# Patient Record
Sex: FEMALE | Race: WHITE | URBAN - METROPOLITAN AREA
[De-identification: names, ages, dates, MRNs, and addresses within clinical notes are randomized per-mention and may not be internally consistent; named-entity substitution may affect disease eponyms.]

---

## 2017-01-18 ENCOUNTER — THERAPY VISIT (OUTPATIENT)
Dept: PHYSICAL THERAPY | Facility: CLINIC | Age: 52
End: 2017-01-18
Payer: COMMERCIAL

## 2017-01-18 DIAGNOSIS — M79.642 PAIN OF LEFT HAND: ICD-10-CM

## 2017-01-18 DIAGNOSIS — M25.532 LEFT WRIST PAIN: Primary | ICD-10-CM

## 2017-01-18 PROCEDURE — 97110 THERAPEUTIC EXERCISES: CPT | Mod: GP | Performed by: PHYSICAL THERAPIST

## 2017-01-18 PROCEDURE — 97161 PT EVAL LOW COMPLEX 20 MIN: CPT | Mod: GP | Performed by: PHYSICAL THERAPIST

## 2017-01-18 NOTE — PROGRESS NOTES
[facility] Initial Evaluation      Subjective:    Lennie Hunt is a 51 year old female with a left wrist (L hand, forearm upper arm pain) condition.  Condition occurred with:  Insidious onset.  Condition occurred: for unknown reasons.  This is a chronic condition  Reports onset of L wrist and hand pain in 9/2016. She noted increased pain after lifting/caring for her grandson( 25#) in 12/2016. The pain began radiating into the forearm and upper arm in 12/2016 especially with lifting grasping activities. MD advised PT on 1/6/17 .    Patient reports pain:  Forearm, hand and other (wrist).  Radiates to:  Upper arm.  Pain is described as aching and sharp and is intermittent and reported as 3/10.   Pain is the same all the time.  Symptoms are exacerbated by activity/movement, lifting and carrying and relieved by nothing.  Since onset symptoms are gradually worsening.        General health as reported by patient is good.  Pertinent medical history includes:  Overweight, thyroid problems and menopausal.  Medical allergies: yes (sulfa).    Current medications:  Thyroid medication and hormone replacement therapy.  Current occupation is .  Patient is working in normal job without restrictions.  Primary job tasks include:  Prolonged sitting.        Red flags:  None as reported by the patient.                      Objective:    System              Cervical/Thoracic Evaluation  Cervical AROM: normal           Cervical Myotomes:  normal                                       Shoulder Evaluation:  ROM:  AROM:  normal                            PROM:  normal                                Strength:  normal                                   ROM:  AROM:      Flexion Elbow:  Left:wnl     Extension Elbow:  Left: wnl      Flexion Wrist:  Left: 70+ dorsal wrist    Right: 80  Extension Wrist:  Left: 75+ pain dorsal wrist    Right:  85  Supination Elbow/Wrist:  Left: 80+ wrist/handRight: 85  Pronation  Elbow/Wrist:  Left: 90    Right: 90      PROM:          Flexion Wrist:  Left: 75+    Right:  85  Extension Wrist:  Left: 80+Right: 90              Strength:    Flexion Elbow:  Left: 5/5  Pain:-      Extension Elbow: Left: 5/5  Pain:-      Flexion Wrist:  Left: 5/5 Pain:-      Extension Wrist: Left: 5/5  Pain:-    Supination Elbow/Wrist: Left: 5-/5  Pain:+/-      Pronation Elbow/Wrist: Left: 5/5  Pain:-          Radial Deviation:  Left: 5/5  Pain:-      Ulnar Deviation: Left: 5/5  Pain:-    :     Left: 30# painful      Right: 50#      Palpation:    Left wrist/elbow tenderness present at: Proximal Carpals and Distal Carpals                                  General     ROS    Assessment/Plan:      Patient is a 51 year old female with left side wrist/hand complaints.    Patient has the following significant findings with corresponding treatment plan.                Diagnosis 1:  Arm pain, wrist and hand sprain/strain  Pain -  hot/cold therapy, manual therapy, self management, education and home program  Decreased ROM/flexibility - manual therapy, therapeutic exercise and home program  Decreased joint mobility - manual therapy, therapeutic exercise and home program  Decreased strength - therapeutic exercise, therapeutic activities and home program  Impaired muscle performance - neuro re-education and home program  Decreased function - therapeutic activities and home program    Therapy Evaluation Codes:   1) History comprised of:   Personal factors that impact the plan of care:      None.    Comorbidity factors that impact the plan of care are:      None.     Medications impacting care: None.  2) Examination of Body Systems comprised of:   Body structures and functions that impact the plan of care:      Cervical spine, Elbow, Hand, Shoulder and Wrist.   Activity limitations that impact the plan of care are:      Bathing, Cooking, Grasping and Lifting.  3) Clinical presentation characteristics  are:   Evolving/Changing.  4) Decision-Making    Low complexity using standardized patient assessment instrument and/or measureable assessment of functional outcome.  Cumulative Therapy Evaluation is: Low complexity.    Previous and current functional limitations:  (See Goal Flow Sheet for this information)    Short term and Long term goals: (See Goal Flow Sheet for this information)     Communication ability:  Patient appears to be able to clearly communicate and understand verbal and written communication and follow directions correctly.  Treatment Explanation - The following has been discussed with the patient:   RX ordered/plan of care  Anticipated outcomes  Possible risks and side effects  This patient would benefit from PT intervention to resume normal activities.   Rehab potential is good.    Frequency:  1 X week, once daily  Duration:  for 2 weeks then 2x month for 2 months ( advise to FU with hand therapist)   Discharge Plan:  Achieve all LTG.  Independent in home treatment program.  Reach maximal therapeutic benefit.    Please refer to the daily flowsheet for treatment today, total treatment time and time spent performing 1:1 timed codes.

## 2017-06-03 ENCOUNTER — HEALTH MAINTENANCE LETTER (OUTPATIENT)
Age: 52
End: 2017-06-03